# Patient Record
Sex: MALE | Race: WHITE | NOT HISPANIC OR LATINO | Employment: FULL TIME | URBAN - METROPOLITAN AREA
[De-identification: names, ages, dates, MRNs, and addresses within clinical notes are randomized per-mention and may not be internally consistent; named-entity substitution may affect disease eponyms.]

---

## 2023-10-17 ENCOUNTER — TELEPHONE (OUTPATIENT)
Dept: DERMATOLOGY | Facility: CLINIC | Age: 23
End: 2023-10-17
Payer: COMMERCIAL

## 2023-10-17 ENCOUNTER — PHARMACY VISIT (OUTPATIENT)
Dept: PHARMACY | Facility: CLINIC | Age: 23
End: 2023-10-17
Payer: COMMERCIAL

## 2023-10-17 PROCEDURE — RXMED WILLOW AMBULATORY MEDICATION CHARGE

## 2023-10-17 NOTE — TELEPHONE ENCOUNTER
Pt LM inquiring about Opzelura prescription sent 9/26/23. Email sent to  Specialty and PA is pending. Returned call to pt and informed him of this. Per RP pt can  samples if he would like. Pt will stop by office to . Thankful for call back.     Per  specialty, PA approved. The ere going to contact pt to set up delivery.

## 2023-10-19 ENCOUNTER — SPECIALTY PHARMACY (OUTPATIENT)
Dept: PHARMACY | Facility: CLINIC | Age: 23
End: 2023-10-19

## 2023-11-16 ENCOUNTER — PHARMACY VISIT (OUTPATIENT)
Dept: PHARMACY | Facility: CLINIC | Age: 23
End: 2023-11-16
Payer: COMMERCIAL

## 2024-01-10 ENCOUNTER — SPECIALTY PHARMACY (OUTPATIENT)
Dept: PHARMACY | Facility: CLINIC | Age: 24
End: 2024-01-10

## 2024-05-13 ENCOUNTER — OFFICE VISIT (OUTPATIENT)
Dept: PRIMARY CARE | Facility: CLINIC | Age: 24
End: 2024-05-13
Payer: COMMERCIAL

## 2024-05-13 VITALS
BODY MASS INDEX: 24.08 KG/M2 | SYSTOLIC BLOOD PRESSURE: 111 MMHG | DIASTOLIC BLOOD PRESSURE: 69 MMHG | RESPIRATION RATE: 16 BRPM | TEMPERATURE: 98.3 F | HEIGHT: 67 IN | WEIGHT: 153.4 LBS | HEART RATE: 96 BPM | OXYGEN SATURATION: 96 %

## 2024-05-13 DIAGNOSIS — S46.812A STRAIN OF LEFT DELTOID MUSCLE, INITIAL ENCOUNTER: Primary | ICD-10-CM

## 2024-05-13 DIAGNOSIS — M79.10 MUSCLE DISCOMFORT: ICD-10-CM

## 2024-05-13 PROCEDURE — 99212 OFFICE O/P EST SF 10 MIN: CPT | Performed by: NURSE PRACTITIONER

## 2024-05-13 RX ORDER — METHOCARBAMOL 500 MG/1
500 TABLET, FILM COATED ORAL 4 TIMES DAILY PRN
Qty: 40 TABLET | Refills: 0 | Status: SHIPPED | OUTPATIENT
Start: 2024-05-13 | End: 2024-07-12

## 2024-05-13 ASSESSMENT — PATIENT HEALTH QUESTIONNAIRE - PHQ9
2. FEELING DOWN, DEPRESSED OR HOPELESS: NOT AT ALL
SUM OF ALL RESPONSES TO PHQ9 QUESTIONS 1 AND 2: 0
1. LITTLE INTEREST OR PLEASURE IN DOING THINGS: NOT AT ALL

## 2024-05-13 NOTE — PROGRESS NOTES
"Subjective   Patient ID: Greg Laura is a 23 y.o. male who presents for shoulder injury/discomfort      Symptoms: left shoulder muscle spasm, pt states it probably from repetitive motion, pain quality is aching and cramping, pain does not radiate,   Length of symptoms: 1 yr ago, pain scale 9/10 when in pain / discomfort. Pt states when its aggravated he feels numbness and tingling.  OTC: none  Related information:   HPI     Review of Systems    Objective   /69 Comment: auto  Pulse 96   Temp 36.8 °C (98.3 °F)   Resp 16   Ht 1.702 m (5' 7\")   Wt 69.6 kg (153 lb 6.4 oz)   SpO2 96%   BMI 24.03 kg/m²     Physical Exam    Assessment/Plan          "

## 2024-05-13 NOTE — PROGRESS NOTES
"Subjective   Patient ID: Greg Laura is a 23 y.o. male who is with chief complaint of \"discomfort\" on the left deltoid area.    HPI  Patient is a 23 y.o. male who CONSULTED AT [facility] today. Patient is with complaint of \"discomfort\" on the left deltoid area. Patient states this has been going on for almost 1 year. Patient states the pain is on the left shoulder (patient points to area of the belly of the left deltoid muscle), spasm / achy in character, 5-6/10, intermittent, aggravated by movement, and non radiating. he denies fever, chills, paresthesia, paralysis, nor change in the color of the skin or nails of involved extremity. he states he tried OTC medications which afforded only slight relief of symptoms. He states that he does not recall any injury nor any particular precipitating event.     Review of Systems  General: no weight loss, generally healthy, no fatigue  Head:  no headaches / sinus pain, no vertigo, no injury  Eyes: no diplopia, no tearing, no pain,   Ears: no change in hearing, no tinnitus, no bleeding, no vertigo  Mouth:  no dental difficulties, no gingival bleeding, no sore throat, no loss of sense of taste  Nose: no congestion, no  discharge, no bleeding, no obstruction, no loss of sense of smell  Neck: no stiffness, no pain, no tenderness, no masses, no bruit  Pulmonary: no dyspnea, no wheezing, no hemoptysis, no cough  Cardiovascular: no chest pain, no palpitations, no syncope, no orthopnea  Gastrointestinal: no change in appetite, no dysphagia, no abdominal pains, no diarrhea, no emesis, no melena  Genito Urinary: no dysuria, no urinary urgency, no nocturia, no incontinence, no change in nature of urine  Musculoskeletal: (+) \"discomfort\" on the left deltoid muscle, no joint pain, no limitation of range of motion, no paresthesia, no numbness  Constitutional: no fever, no chills, no night sweats    Objective   Physical Exam  General: ambulatory, in no acute " distress  Head: normocephalic, no lesions  Neck: supple, no masses, no bruits  Musculoskeletal: no limitation of range of motion, no paralysis, no deformity; (+) direct tenderness on the left deltoid muscle area, full and equal peripheral pulses, no edema, < 2 seconds capillary refill on all toes    Assessment/Plan   Problem List Items Addressed This Visit    None  Visit Diagnoses         Codes    Strain of left deltoid muscle, initial encounter    -  Primary S46.812A    Relevant Medications    methocarbamol (Robaxin) 500 mg tablet    Muscle discomfort     M79.10    Relevant Medications    methocarbamol (Robaxin) 500 mg tablet        DISCHARGE SUMMARY:   Patient was seen and examined. Diagnosis, treatment, treatment options, and possible complications of today's illness discussed and explained to patient. Patient to take medication/s associated with this visit.  Patient may use OTC menthol patches as needed for comfort. Advised stretching and warm up exercises as tolerated prior to more intense physical exertion / exercise.  Advised to avoid heavy lifting, pulling, or pushing for at least a week. Reinforce stretching and exercises that strengthen core muscles.     Patient to come back in 4 - 7 days if needed for worsening symptoms. Patient verbalized understanding of plan of care.           CLEMENTE Santiago-CNP 05/13/24 5:14 PM

## 2024-05-14 ASSESSMENT — PATIENT HEALTH QUESTIONNAIRE - PHQ9
1. LITTLE INTEREST OR PLEASURE IN DOING THINGS: NOT AT ALL
SUM OF ALL RESPONSES TO PHQ9 QUESTIONS 1 AND 2: 0
2. FEELING DOWN, DEPRESSED OR HOPELESS: NOT AT ALL

## 2024-05-14 ASSESSMENT — ENCOUNTER SYMPTOMS
LOSS OF SENSATION IN FEET: 0
DEPRESSION: 0
OCCASIONAL FEELINGS OF UNSTEADINESS: 0

## 2024-08-19 ENCOUNTER — APPOINTMENT (OUTPATIENT)
Dept: PRIMARY CARE | Facility: CLINIC | Age: 24
End: 2024-08-19
Payer: COMMERCIAL

## 2025-02-11 ENCOUNTER — APPOINTMENT (OUTPATIENT)
Dept: DERMATOLOGY | Facility: CLINIC | Age: 25
End: 2025-02-11
Payer: COMMERCIAL

## 2025-04-22 NOTE — PROGRESS NOTES
Chief Complaint   Patient presents with    Left Shoulder - Pain, New Patient Visit     Xrays today     History of Present Illness:  Greg Laura is a 24 y.o. male presenting to clinic as a new patient  for his left  shoulder. He reports that his shoulder has been bothering him intermittently for the last year. He complains of muscle spasms and imbalance. He denies any discreet injury but feels that he aggravated it while lifting. He is right hand dominant.     Imaging:  X-rays left  shoulder: Shows no acute fractures or dislocations. No arthritic change.       Assessment:   Left rotator cuff tendonitis    Plan:  We reviewed the role of imaging, physical therapy, injections and the time frame to healing and correlation with outcome.  Medrol Dosepak  NSAID: Ibuprofen consistently for one week then as needed. GI side effects and medical risks discussed.  Ice: 60 minutes on and off  Exercise home program: Medically directed shoulder therapy / Handout given. Kayce 12.   Formal therapy at [facility].  Follow-up in 6 weeks.      Physical Exam:  Left Shoulder:  Strength / ROM: 5/5 rotator cuff strength full passive motion  Minor pain with impingement  Speeds test + impingement  Positive Neer and Hawkin´s test  Neurovascular exam normal distally       Review of Systems:  GENERAL: Negative for malaise, significant weight loss, fever  MUSCULOSKELETAL: See HPI  NEURO:  Negative     Medical History[1]    Medication Documentation Review Audit       Reviewed by ADILSON Santiago (Nurse Practitioner) on 05/13/24 at 1718      Medication Order Taking? Sig Documenting Provider Last Dose Status   ruxolitinib (Opzelura) 1.5 % cream cream [id number] Yes APPLY 1 APPLICATION TO AFFECTED AREA TWICE A DAY AS DIRECTED Myesha Segura, DO Taking Active                    RX Allergies[2]    Social History     Socioeconomic History    Marital status: Single     Spouse name: Not on file    Number of children: Not on file    Years of  education: Not on file    Highest education level: Not on file   Occupational History    Not on file   Tobacco Use    Smoking status: Never    Smokeless tobacco: Never   Substance and Sexual Activity    Alcohol use: Not on file    Drug use: Not on file    Sexual activity: Not on file   Other Topics Concern    Not on file   Social History Narrative    Not on file     Social Drivers of Health     Financial Resource Strain: Not on file   Food Insecurity: Not on file   Transportation Needs: Not on file   Physical Activity: Not on file   Stress: Not on file   Social Connections: Not on file   Intimate Partner Violence: Not on file   Housing Stability: Not on file       Surgical History[3]    Imaging  No results found.    Cardiology, Vascular, and Other Imaging  No other imaging results found for the past 7 days         Scribe Attestation  By signing my name below, Sejal SALINAS, Scribe   attest that this documentation has been prepared under the direction and in the presence of Bhaskar Arteaga MD.          [1]   Past Medical History:  Diagnosis Date    Other specified health status     Known health problems: none   [2] No Known Allergies  [3]   Past Surgical History:  Procedure Laterality Date    OTHER SURGICAL HISTORY  08/14/2019    No history of surgery

## 2025-04-25 ENCOUNTER — OFFICE VISIT (OUTPATIENT)
Dept: ORTHOPEDIC SURGERY | Facility: CLINIC | Age: 25
End: 2025-04-25
Payer: COMMERCIAL

## 2025-04-25 ENCOUNTER — HOSPITAL ENCOUNTER (OUTPATIENT)
Dept: RADIOLOGY | Facility: CLINIC | Age: 25
Discharge: HOME | End: 2025-04-25
Payer: COMMERCIAL

## 2025-04-25 DIAGNOSIS — M25.512 LEFT SHOULDER PAIN, UNSPECIFIED CHRONICITY: ICD-10-CM

## 2025-04-25 DIAGNOSIS — M75.82 ROTATOR CUFF TENDONITIS, LEFT: Primary | ICD-10-CM

## 2025-04-25 PROCEDURE — 99203 OFFICE O/P NEW LOW 30 MIN: CPT | Performed by: ORTHOPAEDIC SURGERY

## 2025-04-25 PROCEDURE — 73030 X-RAY EXAM OF SHOULDER: CPT | Mod: LT

## 2025-04-25 RX ORDER — METHYLPREDNISOLONE 4 MG/1
TABLET ORAL
Qty: 21 TABLET | Refills: 0 | Status: SHIPPED | OUTPATIENT
Start: 2025-04-25

## 2025-04-25 RX ORDER — IBUPROFEN 800 MG/1
800 TABLET ORAL EVERY 8 HOURS PRN
Qty: 90 TABLET | Refills: 0 | Status: SHIPPED | OUTPATIENT
Start: 2025-04-25 | End: 2025-05-25

## 2025-06-05 NOTE — PROGRESS NOTES
No chief complaint on file.    History of Present Illness:  6/6/2025: ***     4/25/2025: Greg Laura is a 24 y.o. male presenting to clinic as a new patient  for his left  shoulder. He reports that his shoulder has been bothering him intermittently for the last year. He complains of muscle spasms and imbalance. He denies any discreet injury but feels that he aggravated it while lifting. He is right hand dominant.     Imaging:  X-rays left  shoulder: Shows no acute fractures or dislocations. No arthritic change.       Assessment:   Left rotator cuff tendonitis    Plan:  We reviewed the role of imaging, physical therapy, injections and the time frame to healing and correlation with outcome.  Medrol Dosepak  NSAID: Ibuprofen consistently for one week then as needed. GI side effects and medical risks discussed.  Ice: 60 minutes on and off  Exercise home program: Medically directed shoulder therapy / Handout given. Kayce 12.   Formal therapy at [facility].  Follow-up in 6 weeks.      Physical Exam:  Left Shoulder:  Strength / ROM: 5/5 rotator cuff strength full passive motion  Minor pain with impingement  Speeds test + impingement  Positive Neer and Hawkin´s test  Neurovascular exam normal distally       Review of Systems:  GENERAL: Negative for malaise, significant weight loss, fever  MUSCULOSKELETAL: See HPI  NEURO:  Negative     Medical History[1]    Medication Documentation Review Audit       Reviewed by ADILSON Santiago (Nurse Practitioner) on 05/13/24 at 1718      Medication Order Taking? Sig Documenting Provider Last Dose Status   ruxolitinib (Opzelura) 1.5 % cream cream [id number] Yes APPLY 1 APPLICATION TO AFFECTED AREA TWICE A DAY AS DIRECTED Myesha Segura, DO Taking Active                    RX Allergies[2]    Social History     Socioeconomic History    Marital status: Single     Spouse name: Not on file    Number of children: Not on file    Years of education: Not on file    Highest education level:  Not on file   Occupational History    Not on file   Tobacco Use    Smoking status: Never    Smokeless tobacco: Never   Substance and Sexual Activity    Alcohol use: Not on file    Drug use: Not on file    Sexual activity: Not on file   Other Topics Concern    Not on file   Social History Narrative    Not on file     Social Drivers of Health     Financial Resource Strain: Not on file   Food Insecurity: Not on file   Transportation Needs: Not on file   Physical Activity: Not on file   Stress: Not on file   Social Connections: Not on file   Intimate Partner Violence: Not on file   Housing Stability: Not on file       Surgical History[3]    Imaging  No results found.    Cardiology, Vascular, and Other Imaging  No other imaging results found for the past 7 days        Scribe Attestation:  By signing my name below, I, Isa Sawyer attest that this documentation has been prepared under the direction and in the presence of Bhaskar Arteaga MD.    Provider Attestation - Scribe documentation:  All medical record entries made by Sejal Deutsch were at my direction and personally dictated by me, Bhaskar Arteaga MD. I have reviewed the chart and agree that the record is accurate and I confirmed that it reflects my personal performance of the history, physical exam, discussion, and plan.        [1]   Past Medical History:  Diagnosis Date    Other specified health status     Known health problems: none   [2] No Known Allergies  [3]   Past Surgical History:  Procedure Laterality Date    OTHER SURGICAL HISTORY  08/14/2019    No history of surgery

## 2025-06-06 ENCOUNTER — APPOINTMENT (OUTPATIENT)
Dept: ORTHOPEDIC SURGERY | Facility: CLINIC | Age: 25
End: 2025-06-06
Payer: COMMERCIAL

## 2025-06-11 ENCOUNTER — APPOINTMENT (OUTPATIENT)
Dept: PRIMARY CARE | Facility: CLINIC | Age: 25
End: 2025-06-11
Payer: COMMERCIAL

## 2025-08-22 ENCOUNTER — APPOINTMENT (OUTPATIENT)
Dept: DERMATOLOGY | Facility: CLINIC | Age: 25
End: 2025-08-22
Payer: COMMERCIAL

## 2025-08-22 DIAGNOSIS — D22.60 MELANOCYTIC NEVI OF UNSPECIFIED UPPER LIMB, INCLUDING SHOULDER: ICD-10-CM

## 2025-08-22 DIAGNOSIS — D22.5 MELANOCYTIC NEVI OF TRUNK: ICD-10-CM

## 2025-08-22 DIAGNOSIS — D22.72 MELANOCYTIC NEVUS OF LEFT LOWER EXTREMITY: ICD-10-CM

## 2025-08-22 DIAGNOSIS — L70.0 ACNE VULGARIS: ICD-10-CM

## 2025-08-22 DIAGNOSIS — Z12.83 ENCOUNTER FOR SCREENING FOR MALIGNANT NEOPLASM OF SKIN: ICD-10-CM

## 2025-08-22 DIAGNOSIS — D22.71 MELANOCYTIC NEVI OF RIGHT LOWER LIMB, INCLUDING HIP: ICD-10-CM

## 2025-08-22 DIAGNOSIS — L80 VITILIGO: Primary | ICD-10-CM

## 2025-08-22 PROCEDURE — 1036F TOBACCO NON-USER: CPT | Performed by: DERMATOLOGY

## 2025-08-22 PROCEDURE — 99214 OFFICE O/P EST MOD 30 MIN: CPT | Performed by: DERMATOLOGY

## 2025-08-22 RX ORDER — CLINDAMYCIN PHOSPHATE 10 MG/ML
1 SOLUTION TOPICAL DAILY
Qty: 69 EACH | Refills: 11 | Status: CANCELLED | OUTPATIENT
Start: 2025-08-22

## 2025-08-22 RX ORDER — RUXOLITINIB 15 MG/G
1 CREAM TOPICAL 2 TIMES DAILY
Qty: 60 G | Refills: 3 | Status: SHIPPED | OUTPATIENT
Start: 2025-08-22

## 2025-08-22 ASSESSMENT — DERMATOLOGY PATIENT ASSESSMENT
DO YOU USE A TANNING BED: NO
DO YOU HAVE ANY NEW OR CHANGING LESIONS: NO
DO YOU USE SUNSCREEN: OCCASIONALLY
ARE YOU AN ORGAN TRANSPLANT RECIPIENT: NO
FOR PATIENTS COMING IN FOR A FOLLOW-UP VISIT - HAVE THERE BEEN ANY CHANGES IN YOUR HEALTH SINCE YOUR LAST VISIT: ALL IN MYCHART

## 2025-08-22 ASSESSMENT — DERMATOLOGY QUALITY OF LIFE (QOL) ASSESSMENT
RATE HOW BOTHERED YOU ARE BY EFFECTS OF YOUR SKIN PROBLEMS ON YOUR ACTIVITIES (EG, GOING OUT, ACCOMPLISHING WHAT YOU WANT, WORK ACTIVITIES OR YOUR RELATIONSHIPS WITH OTHERS): 3
WHAT SINGLE SKIN CONDITION LISTED BELOW IS THE PATIENT ANSWERING THE QUALITY-OF-LIFE ASSESSMENT QUESTIONS ABOUT: VITILIGO
ARE THERE EXCLUSIONS OR EXCEPTIONS FOR THE QUALITY OF LIFE ASSESSMENT: NO
RATE HOW EMOTIONALLY BOTHERED YOU ARE BY YOUR SKIN PROBLEM (FOR EXAMPLE, WORRY, EMBARRASSMENT, FRUSTRATION): 2
RATE HOW BOTHERED YOU ARE BY SYMPTOMS OF YOUR SKIN PROBLEM (EG, ITCHING, STINGING BURNING, HURTING OR SKIN IRRITATION): 2

## 2025-08-22 ASSESSMENT — ITCH NUMERIC RATING SCALE: HOW SEVERE IS YOUR ITCHING?: 0

## 2025-08-22 ASSESSMENT — PATIENT GLOBAL ASSESSMENT (PGA): PATIENT GLOBAL ASSESSMENT: PATIENT GLOBAL ASSESSMENT:  2 - MILD

## 2025-08-27 PROCEDURE — RXMED WILLOW AMBULATORY MEDICATION CHARGE

## 2025-08-28 ENCOUNTER — SPECIALTY PHARMACY (OUTPATIENT)
Dept: PHARMACY | Facility: CLINIC | Age: 25
End: 2025-08-28

## 2025-08-29 ENCOUNTER — PHARMACY VISIT (OUTPATIENT)
Dept: PHARMACY | Facility: CLINIC | Age: 25
End: 2025-08-29
Payer: COMMERCIAL

## 2026-08-28 ENCOUNTER — APPOINTMENT (OUTPATIENT)
Dept: DERMATOLOGY | Facility: CLINIC | Age: 26
End: 2026-08-28
Payer: COMMERCIAL